# Patient Record
Sex: FEMALE | Race: WHITE | NOT HISPANIC OR LATINO | Employment: UNEMPLOYED | RURAL
[De-identification: names, ages, dates, MRNs, and addresses within clinical notes are randomized per-mention and may not be internally consistent; named-entity substitution may affect disease eponyms.]

---

## 2022-07-07 ENCOUNTER — HOSPITAL ENCOUNTER (EMERGENCY)
Facility: HOSPITAL | Age: 37
Discharge: HOME | End: 2022-07-07

## 2022-07-07 VITALS
RESPIRATION RATE: 18 BRPM | HEART RATE: 91 BPM | WEIGHT: 280.38 LBS | OXYGEN SATURATION: 100 % | SYSTOLIC BLOOD PRESSURE: 157 MMHG | BODY MASS INDEX: 41.53 KG/M2 | TEMPERATURE: 97 F | DIASTOLIC BLOOD PRESSURE: 97 MMHG | HEIGHT: 69 IN

## 2022-07-07 DIAGNOSIS — S51.811A LACERATION OF RIGHT FOREARM, INITIAL ENCOUNTER: Primary | ICD-10-CM

## 2022-07-07 DIAGNOSIS — S60.419A ABRASION OF FINGER OF RIGHT HAND, INITIAL ENCOUNTER: ICD-10-CM

## 2022-07-07 DIAGNOSIS — T14.90XA INJURY: ICD-10-CM

## 2022-07-07 LAB
BASOPHILS # BLD AUTO: 0.02 K/UL (ref 0–0.2)
BASOPHILS NFR BLD AUTO: 0.2 % (ref 0–1)
DIFFERENTIAL METHOD BLD: ABNORMAL
EOSINOPHIL # BLD AUTO: 0.17 K/UL (ref 0–0.5)
EOSINOPHIL NFR BLD AUTO: 1.6 % (ref 1–4)
ERYTHROCYTE [DISTWIDTH] IN BLOOD BY AUTOMATED COUNT: 13.2 % (ref 11.5–14.5)
HCT VFR BLD AUTO: 39.8 % (ref 38–47)
HGB BLD-MCNC: 13.1 G/DL (ref 12–16)
LYMPHOCYTES # BLD AUTO: 2.82 K/UL (ref 1–4.8)
LYMPHOCYTES NFR BLD AUTO: 26.2 % (ref 27–41)
MCH RBC QN AUTO: 30 PG (ref 27–31)
MCHC RBC AUTO-ENTMCNC: 32.9 G/DL (ref 32–36)
MCV RBC AUTO: 91.3 FL (ref 80–96)
MONOCYTES # BLD AUTO: 0.86 K/UL (ref 0–0.8)
MONOCYTES NFR BLD AUTO: 8 % (ref 2–6)
MPC BLD CALC-MCNC: 8.7 FL (ref 9.4–12.4)
NEUTROPHILS # BLD AUTO: 6.91 K/UL (ref 1.8–7.7)
NEUTROPHILS NFR BLD AUTO: 64 % (ref 53–65)
PLATELET # BLD AUTO: 429 K/UL (ref 150–400)
RBC # BLD AUTO: 4.36 M/UL (ref 4.2–5.4)
WBC # BLD AUTO: 10.78 K/UL (ref 4.5–11)

## 2022-07-07 PROCEDURE — 12002 RPR S/N/AX/GEN/TRNK2.6-7.5CM: CPT

## 2022-07-07 PROCEDURE — 63600175 PHARM REV CODE 636 W HCPCS: Performed by: NURSE PRACTITIONER

## 2022-07-07 PROCEDURE — 99284 EMERGENCY DEPT VISIT MOD MDM: CPT | Mod: 25

## 2022-07-07 PROCEDURE — 85025 COMPLETE CBC W/AUTO DIFF WBC: CPT | Performed by: NURSE PRACTITIONER

## 2022-07-07 PROCEDURE — 99283 EMERGENCY DEPT VISIT LOW MDM: CPT | Mod: GF,25

## 2022-07-07 PROCEDURE — 36415 COLL VENOUS BLD VENIPUNCTURE: CPT | Performed by: NURSE PRACTITIONER

## 2022-07-07 PROCEDURE — 25000003 PHARM REV CODE 250

## 2022-07-07 PROCEDURE — 96374 THER/PROPH/DIAG INJ IV PUSH: CPT

## 2022-07-07 PROCEDURE — 25000003 PHARM REV CODE 250: Performed by: NURSE PRACTITIONER

## 2022-07-07 PROCEDURE — 12002 RPR S/N/AX/GEN/TRNK2.6-7.5CM: CPT | Mod: GF

## 2022-07-07 RX ORDER — TRAMADOL HYDROCHLORIDE 50 MG/1
50 TABLET ORAL EVERY 6 HOURS PRN
Qty: 6 TABLET | Refills: 0 | Status: SHIPPED | OUTPATIENT
Start: 2022-07-07

## 2022-07-07 RX ORDER — TRAMADOL HYDROCHLORIDE 50 MG/1
50 TABLET ORAL EVERY 6 HOURS PRN
Qty: 12 TABLET | Refills: 0 | Status: SHIPPED | OUTPATIENT
Start: 2022-07-07 | End: 2022-07-07 | Stop reason: SDUPTHER

## 2022-07-07 RX ORDER — LIDOCAINE HYDROCHLORIDE 10 MG/ML
1 INJECTION INFILTRATION; PERINEURAL
Status: COMPLETED | OUTPATIENT
Start: 2022-07-07 | End: 2022-07-07

## 2022-07-07 RX ORDER — SODIUM CHLORIDE 0.9 % (FLUSH) 0.9 %
10 SYRINGE (ML) INJECTION
Status: DISCONTINUED | OUTPATIENT
Start: 2022-07-07 | End: 2022-07-07 | Stop reason: HOSPADM

## 2022-07-07 RX ORDER — MORPHINE SULFATE 4 MG/ML
2 INJECTION, SOLUTION INTRAMUSCULAR; INTRAVENOUS
Status: COMPLETED | OUTPATIENT
Start: 2022-07-07 | End: 2022-07-07

## 2022-07-07 RX ADMIN — BACITRACIN ZINC, NEOMYCIN, POLYMYXIN B: 400; 3.5; 5 OINTMENT TOPICAL at 08:07

## 2022-07-07 RX ADMIN — LIDOCAINE HYDROCHLORIDE 1 ML: 10 INJECTION, SOLUTION INFILTRATION; PERINEURAL at 07:07

## 2022-07-07 RX ADMIN — MORPHINE SULFATE 2 MG: 4 INJECTION INTRAVENOUS at 06:07

## 2022-07-07 NOTE — ED TRIAGE NOTES
Ambulatory to Exam 3A. Pt states she was cleaning a window and her hand went through it. Left wrist laceration wrapped with bandana upon arrival. Approx 2 in laceration noted to area. Ashley removed pressure dressing applied to site. Pt able to move fingers and wrist. Pain 8/10. NP at bedside. Pt placed on monitors. Tetanus UTD, had less than 3 years ago.    complains of pain/discomfort

## 2022-07-07 NOTE — ED PROVIDER NOTES
Encounter Date: 7/7/2022       History     Chief Complaint   Patient presents with    Laceration     37 yr old WF to ED with c/o laceration to the right wrist s/p accidentally putting hand through window.  States was cleaning window and leaning on frame, hand slipped and went through the window.      The history is provided by the patient.   Laceration   The incident occurred just prior to arrival. The laceration is located on the left arm. The laceration is 4 cm in size. The pain is at a severity of 10/10. Her tetanus status is UTD.     Review of patient's allergies indicates:   Allergen Reactions    Adhesive tape-silicones     Ambien [zolpidem] Hallucinations    Rocephin [ceftriaxone] Rash     History reviewed. No pertinent past medical history.  History reviewed. No pertinent surgical history.  History reviewed. No pertinent family history.  Social History     Tobacco Use    Smoking status: Current Some Day Smoker     Packs/day: 0.50     Types: Cigarettes    Smokeless tobacco: Never Used   Substance Use Topics    Alcohol use: Not Currently    Drug use: Never     Review of Systems   Constitutional: Negative.    Respiratory: Negative.    Cardiovascular: Negative.    Musculoskeletal:        Pain to wrist and forearm   Skin: Positive for wound.       Physical Exam     Initial Vitals [07/07/22 1822]   BP Pulse Resp Temp SpO2   (!) 146/105 102 19 98.1 °F (36.7 °C) 97 %      MAP       --         Physical Exam    Constitutional: She appears well-developed and well-nourished. She is Obese . She is cooperative.   Cardiovascular: Normal rate and regular rhythm.   Pulmonary/Chest: Breath sounds normal.     Neurological: She is alert and oriented to person, place, and time. GCS score is 15. GCS eye subscore is 4. GCS verbal subscore is 5. GCS motor subscore is 6.   Skin: Skin is warm and dry. Capillary refill takes less than 2 seconds.         Medical Screening Exam   See Full Note    ED Course   Lac  Repair    Date/Time: 7/7/2022 8:33 PM  Performed by: EAN Atkins  Authorized by: EAN Atkins     Consent:     Consent given by:  Patient    Risks discussed:  Pain, retained foreign body and infection  Universal protocol:     Procedure explained and questions answered to patient or proxy's satisfaction: yes      Relevant documents present and verified: yes      Imaging studies available: yes      Immediately prior to procedure, a time out was called: yes      Patient identity confirmed:  Verbally with patient  Anesthesia:     Anesthesia method:  Local infiltration    Local anesthetic:  Lidocaine 1% w/o epi  Laceration details:     Location:  Shoulder/arm    Shoulder/arm location:  R lower arm    Length (cm):  4    Depth (mm):  3  Pre-procedure details:     Preparation:  Patient was prepped and draped in usual sterile fashion  Exploration:     Hemostasis achieved with:  Direct pressure    Wound exploration: wound explored through full range of motion and entire depth of wound visualized    Treatment:     Area cleansed with:  Povidone-iodine and saline    Amount of cleaning:  Standard    Irrigation volume:  120    Irrigation method:  Syringe    Debridement:  None    Undermining:  None  Skin repair:     Repair method:  Sutures    Suture size:  4-0    Suture material:  Nylon    Number of sutures:  9  Approximation:     Approximation:  Close  Repair type:     Repair type:  Simple  Post-procedure details:     Dressing:  Antibiotic ointment and non-adherent dressing    Procedure completion:  Tolerated well, no immediate complications  Lac Repair    Date/Time: 7/7/2022 8:35 PM  Performed by: EAN Atkins  Authorized by: EAN Atkins     Consent:     Consent given by:  Patient    Risks discussed:  Infection and pain  Universal protocol:     Patient identity confirmed:  Verbally with patient  Laceration details:     Location:  Shoulder/arm    Shoulder/arm location:  R lower arm    Length  (cm):  0.5  Pre-procedure details:     Preparation:  Patient was prepped and draped in usual sterile fashion  Exploration:     Hemostasis achieved with:  Direct pressure  Treatment:     Area cleansed with:  Saline    Amount of cleaning:  Standard  Skin repair:     Repair method:  Sutures    Suture size:  4-0    Suture material:  Nylon    Suture technique:  Simple interrupted    Number of sutures:  1  Approximation:     Approximation:  Close  Repair type:     Repair type:  Simple  Post-procedure details:     Dressing:  Non-adherent dressing    Procedure completion:  Tolerated well, no immediate complications      Labs Reviewed   CBC WITH DIFFERENTIAL - Abnormal; Notable for the following components:       Result Value    Platelet Count 429 (*)     MPV 8.7 (*)     Lymphocytes % 26.2 (*)     Monocytes % 8.0 (*)     Monocytes, Absolute 0.86 (*)     All other components within normal limits   CBC W/ AUTO DIFFERENTIAL    Narrative:     The following orders were created for panel order CBC Auto Differential.  Procedure                               Abnormality         Status                     ---------                               -----------         ------                     CBC with Differential[[id number]]        Abnormal            Final result                 Please view results for these tests on the individual orders.          Imaging Results          X-Ray Wrist Complete Right (Final result)  Result time 07/07/22 19:57:24    Final result by Anton Willoughby MD (07/07/22 19:57:24)                 Impression:      No acute fractures.  Distal soft tissue swelling suggested    Place of service: [facility]      Electronically signed by: Anton Willoughby  Date:    07/07/2022  Time:    19:57             Narrative:    EXAMINATION:  XR forearm right two views; XR wrist complete three views right    CLINICAL HISTORY:  Forearm pain wrist pain, injury    COMPARISON:  None available    FINDINGS:  There is no  acute osseous, articular or soft tissue abnormality identified.  No radiopaque foreign bodies are identified in the soft tissues.  Mild soft tissue swelling is suggested about the dorsal wrist distally.    The wrist and elbow joints appear relatively preserved.                               X-Ray Forearm Right (Final result)  Result time 07/07/22 19:57:24    Final result by Anton Willoughby MD (07/07/22 19:57:24)                 Impression:      No acute fractures.  Distal soft tissue swelling suggested    Place of service: [facility]      Electronically signed by: Anton Willoughby  Date:    07/07/2022  Time:    19:57             Narrative:    EXAMINATION:  XR forearm right two views; XR wrist complete three views right    CLINICAL HISTORY:  Forearm pain wrist pain, injury    COMPARISON:  None available    FINDINGS:  There is no acute osseous, articular or soft tissue abnormality identified.  No radiopaque foreign bodies are identified in the soft tissues.  Mild soft tissue swelling is suggested about the dorsal wrist distally.    The wrist and elbow joints appear relatively preserved.                                 Medications   sodium chloride 0.9% flush 10 mL (has no administration in time range)   neomycin-bacitracnZn-polymyxnB 3.5-400-5,000 mg-unit-unit packet (has no administration in time range)   neomycin-bacitracnZn-polymyxnB packet (has no administration in time range)   morphine injection 2 mg (2 mg Intravenous Given 7/7/22 1839)   LIDOcaine HCL 10 mg/ml (1%) injection 1 mL (1 mL Other Given 7/7/22 1904)                       Clinical Impression:   Final diagnoses:  [T14.90XA] Injury  [S51.811A] Laceration of right forearm, initial encounter (Primary)  [S60.419A] Abrasion of finger of right hand, initial encounter          ED Disposition Condition    Discharge Stable        ED Prescriptions     Medication Sig Dispense Start Date End Date Auth. Provider    traMADoL (ULTRAM) 50 mg tablet   (Status: Discontinued) Take 1 tablet (50 mg total) by mouth every 6 (six) hours as needed for Pain. 12 tablet 7/7/2022 7/7/2022 EAN Atkins    traMADoL (ULTRAM) 50 mg tablet Take 1 tablet (50 mg total) by mouth every 6 (six) hours as needed for Pain. 6 tablet 7/7/2022  EAN Atkins        Follow-up Information     Follow up With Specialties Details Why Contact Info    [address] [address] [phone]             EAN Atkins  07/07/22 2042       EAN Atkins  07/07/22 2047

## 2022-07-08 NOTE — DISCHARGE INSTRUCTIONS
Keep would clean and dry with antibacterial soap and water.  Apply neosporin daily.  Wear sling for comfort.  Do not submerge in water.  Only shower and pat dry.  Return in 2 days for wound recheck.  In 21 days for suture removal.  Return sooner for redness, inflammation, drainage, fever or concerns of infection. Change dressing daily and as needed.  After tomorrow - May leave open to air if no chance of contamination otherwise cover with sterile gauze. Take tylenol or ibuprofen for pain, if unrelieved may take Ultram

## 2022-07-08 NOTE — ED NOTES
All wounds were cleaned with sterile saline, patted dry and neosporin applied to wrist wound and first wound on right middle finger. Second wound on right middle finger and steri strips applied to small avulsion . Right  Wrist was cleaned the same as others, neosporin applied, covered with telfa, curlix was applied making a bulky dressing.  Pt was instructed to return in three weeks to have sutures removed.  Pt was given written and verbal instructions of wound care and s/s to observe for infection.

## 2024-05-28 ENCOUNTER — HOSPITAL ENCOUNTER (EMERGENCY)
Facility: HOSPITAL | Age: 39
Discharge: HOME | End: 2024-05-28
Payer: MEDICAID

## 2024-05-28 VITALS
WEIGHT: 293 LBS | RESPIRATION RATE: 20 BRPM | TEMPERATURE: 98 F | HEIGHT: 69 IN | DIASTOLIC BLOOD PRESSURE: 101 MMHG | BODY MASS INDEX: 43.4 KG/M2 | OXYGEN SATURATION: 98 % | SYSTOLIC BLOOD PRESSURE: 163 MMHG | HEART RATE: 105 BPM

## 2024-05-28 DIAGNOSIS — L03.113 CELLULITIS OF RIGHT UPPER EXTREMITY: Primary | ICD-10-CM

## 2024-05-28 PROCEDURE — 99283 EMERGENCY DEPT VISIT LOW MDM: CPT

## 2024-05-28 PROCEDURE — 25000003 PHARM REV CODE 250: Performed by: NURSE PRACTITIONER

## 2024-05-28 PROCEDURE — 99284 EMERGENCY DEPT VISIT MOD MDM: CPT | Mod: ,,, | Performed by: NURSE PRACTITIONER

## 2024-05-28 RX ORDER — SULFAMETHOXAZOLE AND TRIMETHOPRIM 800; 160 MG/1; MG/1
1 TABLET ORAL 2 TIMES DAILY
Qty: 14 TABLET | Refills: 0 | Status: SHIPPED | OUTPATIENT
Start: 2024-05-28 | End: 2024-05-28

## 2024-05-28 RX ORDER — NIFEDIPINE 90 MG/1
60 TABLET, EXTENDED RELEASE ORAL DAILY
COMMUNITY

## 2024-05-28 RX ORDER — HYDROXYZINE PAMOATE 25 MG/1
25 CAPSULE ORAL 4 TIMES DAILY
COMMUNITY

## 2024-05-28 RX ORDER — MIRTAZAPINE 15 MG/1
15 TABLET, ORALLY DISINTEGRATING ORAL NIGHTLY
COMMUNITY

## 2024-05-28 RX ORDER — SULFAMETHOXAZOLE AND TRIMETHOPRIM 800; 160 MG/1; MG/1
1 TABLET ORAL
Status: COMPLETED | OUTPATIENT
Start: 2024-05-28 | End: 2024-05-28

## 2024-05-28 RX ORDER — SULFAMETHOXAZOLE AND TRIMETHOPRIM 800; 160 MG/1; MG/1
1 TABLET ORAL 2 TIMES DAILY
Qty: 14 TABLET | Refills: 0 | Status: SHIPPED | OUTPATIENT
Start: 2024-05-28 | End: 2024-06-04

## 2024-05-28 RX ADMIN — SULFAMETHOXAZOLE AND TRIMETHOPRIM 1 TABLET: 800; 160 TABLET ORAL at 10:05

## 2024-05-29 NOTE — ED TRIAGE NOTES
Presents to ER with c/o right arm pain. Pt states that she was in the hospital a week ago Monday and rec'd IV therapy. Pt has had swelling and pain to Right AC with pain radiating to axilla. Rates pain 5/10.

## 2024-05-29 NOTE — DISCHARGE INSTRUCTIONS
Take antibiotics as directed.  Continue eliquis and other medications as directed.  Keep appointments as directed.  Follow up with [facility] or your primary care provider in 2-3 days for wound recheck.  Return for emergency needs.

## 2024-05-29 NOTE — ED PROVIDER NOTES
"Encounter Date: 5/28/2024       History     Chief Complaint   Patient presents with    Right Arm Pain     39 yr old WF to ED with c/o redness and inflammation to right AC s/p blood draw and IV fluids Monday while at another hospital for "psych issues"  Pt reports hx of DVT and is currently taking Eliquis 5 mg BID     The history is provided by the patient.   Arm Injury   The incident occurred two days ago. She came to the ER via by private vehicle. Her tetanus status is UTD. She has been Behaving normally. Recently, medical care has been given at another facility. Services received include medications given.     Review of patient's allergies indicates:   Allergen Reactions    Adhesive tape-silicones     Ambien [zolpidem] Hallucinations    Rocephin [ceftriaxone] Rash     Past Medical History:   Diagnosis Date    Anticoagulant long-term use     Hypertension      History reviewed. No pertinent surgical history.  No family history on file.  Social History     Tobacco Use    Smoking status: Some Days     Current packs/day: 0.50     Types: Cigarettes    Smokeless tobacco: Never   Substance Use Topics    Alcohol use: Not Currently    Drug use: Never     Review of Systems   Constitutional: Negative.    Respiratory: Negative.     Cardiovascular: Negative.    Genitourinary: Negative.    Skin:  Positive for wound.       Physical Exam     Initial Vitals [05/28/24 2222]   BP Pulse Resp Temp SpO2   (!) 163/101 105 20 98.4 °F (36.9 °C) 98 %      MAP       --         Physical Exam    Constitutional: She is Obese . She is cooperative.   Neck: Neck supple.   Cardiovascular:  Normal rate and regular rhythm.           Pulmonary/Chest: Breath sounds normal.   Musculoskeletal:      Cervical back: Neck supple.     Neurological: She is alert and oriented to person, place, and time. GCS score is 15. GCS eye subscore is 4. GCS verbal subscore is 5. GCS motor subscore is 6.   Skin: Skin is warm and dry.   Redness and inflammation to right AC "   Psychiatric: She has a normal mood and affect.         Medical Screening Exam   See Full Note    ED Course   Procedures  Labs Reviewed - No data to display       Imaging Results    None          Medications   sulfamethoxazole-trimethoprim 800-160mg per tablet 1 tablet (has no administration in time range)     Medical Decision Making  39 yr old WF to ED with c/o redness and inflammation to right AC s/p blood draw and IV fluids Monday.    Pt with hx of DVT, currently taking Eliquis 5 BID.                                        Clinical Impression:   Final diagnoses:  [L03.113] Cellulitis of right upper extremity (Primary)        ED Disposition Condition    Discharge Stable          ED Prescriptions       Medication Sig Dispense Start Date End Date Auth. Provider    sulfamethoxazole-trimethoprim 800-160mg (BACTRIM DS) 800-160 mg Tab Take 1 tablet by mouth 2 (two) times daily. for 7 days 14 tablet 5/28/2024 6/4/2024 Lani Alvarez FNP          Follow-up Information       Follow up With Specialties Details Why Contact [facility], [address] [address] [phone]               Lani Alvarez FNP  05/28/24 1506